# Patient Record
Sex: FEMALE | Race: WHITE | NOT HISPANIC OR LATINO | Employment: UNEMPLOYED | URBAN - METROPOLITAN AREA
[De-identification: names, ages, dates, MRNs, and addresses within clinical notes are randomized per-mention and may not be internally consistent; named-entity substitution may affect disease eponyms.]

---

## 2021-06-15 ENCOUNTER — VBI (OUTPATIENT)
Dept: ADMINISTRATIVE | Facility: OTHER | Age: 41
End: 2021-06-15

## 2021-06-30 ENCOUNTER — ANNUAL EXAM (OUTPATIENT)
Dept: OBGYN CLINIC | Facility: CLINIC | Age: 41
End: 2021-06-30
Payer: COMMERCIAL

## 2021-06-30 VITALS
DIASTOLIC BLOOD PRESSURE: 70 MMHG | SYSTOLIC BLOOD PRESSURE: 110 MMHG | WEIGHT: 147 LBS | BODY MASS INDEX: 25.1 KG/M2 | HEIGHT: 64 IN

## 2021-06-30 DIAGNOSIS — Z01.419 ROUTINE GYNECOLOGICAL EXAMINATION: Primary | ICD-10-CM

## 2021-06-30 DIAGNOSIS — Z12.31 ENCOUNTER FOR SCREENING MAMMOGRAM FOR BREAST CANCER: ICD-10-CM

## 2021-06-30 DIAGNOSIS — Z12.4 SCREENING FOR MALIGNANT NEOPLASM OF THE CERVIX: ICD-10-CM

## 2021-06-30 PROCEDURE — 99395 PREV VISIT EST AGE 18-39: CPT | Performed by: OBSTETRICS & GYNECOLOGY

## 2021-06-30 RX ORDER — METHYLDOPA/HYDROCHLOROTHIAZIDE 250MG-25MG
TABLET ORAL
COMMUNITY

## 2021-06-30 RX ORDER — MULTIVIT WITH MINERALS/LUTEIN
TABLET ORAL
COMMUNITY

## 2021-06-30 RX ORDER — ATORVASTATIN CALCIUM 20 MG/1
1 TABLET, FILM COATED ORAL EVERY 24 HOURS
COMMUNITY
Start: 2021-06-28

## 2021-06-30 RX ORDER — LORATADINE 10 MG/1
TABLET ORAL
COMMUNITY

## 2021-06-30 NOTE — PROGRESS NOTES
[address], [address], [facility], [address] [facility]    ASSESSMENT/PLAN: Isabela Gunn is a 45 y o   who presents for annual gynecologic exam     Encounter for routine gynecologic examination  - Routine well woman exam completed today  - Cervical Cancer Screening: Current ASCCP Guidelines reviewed  Last Pap: 09/15/2017   Next Pap Due: today  - HPV Vaccination status: Not immunized  - Contraceptive counseling discussed  Current contraception: Liletta  - Breast Cancer Screening: Last Mammogram Not on file, ordered  - Colorectal cancer screening was not ordered  - The following were reviewed in today's visit: breast self exam, mammography screening ordered and family planning choices    Additional problems addressed during this visit:  1  Encounter for screening mammogram for breast cancer  -     Mammo screening bilateral w 3d & cad; Future; Expected date: 2021        CC: Annual Gynecologic Examination    HPI: Isabela Gunn is a 45 y o   who presents for annual gynecologic examination  HPI    The following portions of the patient's history were reviewed and updated as appropriate: She  has a past medical history of Abnormal Pap smear of cervix (10/2015), Anemia, and Papanicolaou smear (2017)  She  has a past surgical history that includes Tonsillectomy ()  Her family history includes Breast cancer in her paternal grandmother; Diabetes in her father; Hyperlipidemia in her mother; Hypertension in her father and mother  She  reports that she has quit smoking  She has never used smokeless tobacco  She reports current alcohol use  She reports that she does not use drugs    Current Outpatient Medications   Medication Sig Dispense Refill    atorvastatin (LIPITOR) 20 mg tablet Take 1 tablet by mouth every 24 hours      Echinacea 125 MG CAPS 1 tablet      Levonorgestrel (Liletta, 52 MG,) 19 5 MCG/DAY IUD IUD as directed      loratadine (Allergy) 10 mg tablet 1 tablet      Ascorbic Acid (vitamin C) 1000 MG tablet 1 tablet       No current facility-administered medications for this visit  She is allergic to shellfish-derived products - food allergy       Review of Systems      Objective:  /70   Ht 5' 3 5" (1 613 m)   Wt 66 7 kg (147 lb)   Breastfeeding No   BMI 25 63 kg/m²    Physical Exam  Vitals reviewed  Exam conducted with a chaperone present  Constitutional:       Appearance: Normal appearance  She is normal weight  HENT:      Head: Normocephalic  Eyes:      Conjunctiva/sclera: Conjunctivae normal       Pupils: Pupils are equal, round, and reactive to light  Neck:      Thyroid: No thyroid mass, thyromegaly or thyroid tenderness  Cardiovascular:      Rate and Rhythm: Normal rate  Pulmonary:      Effort: Pulmonary effort is normal    Chest:      Breasts:         Right: Normal  No mass, nipple discharge, skin change or tenderness  Left: Normal  No mass, nipple discharge, skin change or tenderness  Abdominal:      General: Abdomen is flat  Bowel sounds are normal       Palpations: Abdomen is soft  Genitourinary:     General: Normal vulva  Exam position: Lithotomy position  Labia:         Right: No lesion  Left: No lesion  Urethra: No prolapse  Vagina: Normal  No lesions  Cervix: No discharge, lesion or cervical bleeding  Uterus: Normal  Not enlarged and not tender  Adnexa: Right adnexa normal and left adnexa normal         Right: No mass or tenderness  Left: No mass or tenderness  Comments: IUD string in os  Musculoskeletal:      Cervical back: Normal range of motion  Lymphadenopathy:      Cervical: No cervical adenopathy  Upper Body:      Right upper body: No supraclavicular or axillary adenopathy  Left upper body: No supraclavicular or axillary adenopathy  Neurological:      Mental Status: She is alert

## 2021-07-07 LAB
CLINICAL INFO: ABNORMAL
DATE PREVIOUS BX: ABNORMAL
GEN CATEG CVX/VAG CYTO-IMP: ABNORMAL
HPV E6+E7 MRNA CVX QL NAA+PROBE: NOT DETECTED
LMP START DATE: ABNORMAL
SL AMB PREV. PAP:: ABNORMAL
SPECIMEN SOURCE CVX/VAG CYTO: ABNORMAL

## 2021-10-29 ENCOUNTER — OFFICE VISIT (OUTPATIENT)
Dept: OBGYN CLINIC | Facility: CLINIC | Age: 41
End: 2021-10-29
Payer: COMMERCIAL

## 2021-10-29 VITALS
WEIGHT: 147 LBS | BODY MASS INDEX: 25.1 KG/M2 | HEIGHT: 64 IN | SYSTOLIC BLOOD PRESSURE: 112 MMHG | DIASTOLIC BLOOD PRESSURE: 72 MMHG

## 2021-10-29 DIAGNOSIS — B37.3 CANDIDA VAGINITIS: ICD-10-CM

## 2021-10-29 DIAGNOSIS — N90.89 VULVAR EDEMA: ICD-10-CM

## 2021-10-29 DIAGNOSIS — N76.2 ACUTE VULVITIS: Primary | ICD-10-CM

## 2021-10-29 LAB
BV WHIFF TEST VAG QL: ABNORMAL
CLUE CELLS SPEC QL WET PREP: ABNORMAL
PH SMN: 4 [PH]
SL AMB POCT WET MOUNT: ABNORMAL
T VAGINALIS VAG QL WET PREP: ABNORMAL
YEAST VAG QL WET PREP: ABNORMAL

## 2021-10-29 PROCEDURE — 87210 SMEAR WET MOUNT SALINE/INK: CPT | Performed by: OBSTETRICS & GYNECOLOGY

## 2021-10-29 PROCEDURE — 99213 OFFICE O/P EST LOW 20 MIN: CPT | Performed by: OBSTETRICS & GYNECOLOGY

## 2021-10-29 RX ORDER — DIPHENOXYLATE HYDROCHLORIDE AND ATROPINE SULFATE 2.5; .025 MG/1; MG/1
1 TABLET ORAL DAILY
COMMUNITY

## 2021-11-01 ENCOUNTER — TELEPHONE (OUTPATIENT)
Dept: OBGYN CLINIC | Facility: CLINIC | Age: 41
End: 2021-11-01

## 2021-11-03 LAB — QUESTION/PROBLEM: NORMAL

## 2021-12-06 LAB — HSV1 DNA SPEC QL NAA+PROBE: NORMAL

## 2022-05-13 DIAGNOSIS — Z12.31 ENCOUNTER FOR SCREENING MAMMOGRAM FOR BREAST CANCER: ICD-10-CM

## 2022-09-13 ENCOUNTER — ANNUAL EXAM (OUTPATIENT)
Dept: OBGYN CLINIC | Facility: CLINIC | Age: 42
End: 2022-09-13

## 2022-09-13 VITALS
SYSTOLIC BLOOD PRESSURE: 100 MMHG | HEIGHT: 63 IN | WEIGHT: 150.2 LBS | BODY MASS INDEX: 26.61 KG/M2 | DIASTOLIC BLOOD PRESSURE: 60 MMHG

## 2022-09-13 DIAGNOSIS — Z80.3 FAMILY HISTORY OF BREAST CANCER IN FEMALE: ICD-10-CM

## 2022-09-13 DIAGNOSIS — Z12.4 SCREENING FOR MALIGNANT NEOPLASM OF THE CERVIX: ICD-10-CM

## 2022-09-13 DIAGNOSIS — Z12.31 BREAST CANCER SCREENING BY MAMMOGRAM: ICD-10-CM

## 2022-09-13 DIAGNOSIS — Z01.419 ROUTINE GYNECOLOGICAL EXAMINATION: Primary | ICD-10-CM

## 2022-09-13 NOTE — PROGRESS NOTES
[address] [place] [address], [address], [address] [place] [address]    ASSESSMENT/PLAN: Ashley Graham is a 37 y o   who presents for annual gynecologic exam     Encounter for routine gynecologic examination  - Routine well woman exam completed today  - Cervical Cancer Screening: Current ASCCP Guidelines reviewed  Last Pap: 2021   Next Pap Due:   - HPV Vaccination status: Not immunized  - Contraceptive counseling discussed  Current contraception: Daleen Jovany IUD since 2019  - Breast Cancer Screening: Last Mammogram 2022,   - Colorectal cancer screening was not ordered  - The following were reviewed in today's visit: breast self exam, mammography screening ordered and family planning choices    Additional problems addressed during this visit:  1  Routine gynecological examination    2  Family history of breast cancer in female  -     Mammo screening bilateral w 3d & cad; Future; Expected date: 2023    3  Breast cancer screening by mammogram  -     Mammo screening bilateral w 3d & cad; Future; Expected date: 2023    4  Screening for malignant neoplasm of the cervix  -     Thinprep Pap and HPV mRNA E6/E7 Reflex HPV 16,18/45        CC:  Annual Gynecologic Examination    HPI: Ashley Graham is a 37 y o   who presents for annual gynecologic examination  HPI    The following portions of the patient's history were reviewed and updated as appropriate: She  has a past medical history of Abnormal Pap smear of cervix (10/2015), Anemia, and Papanicolaou smear (2017)  She  has a past surgical history that includes Tonsillectomy ()  Her family history includes Breast cancer in her paternal grandmother; Diabetes in her father; Hyperlipidemia in her mother; Hypertension in her father and mother  She  reports that she has quit smoking  She has never used smokeless tobacco  She reports current alcohol use  She reports that she does not use drugs    Current Outpatient Medications   Medication Sig Dispense Refill    Ascorbic Acid (vitamin C) 1000 MG tablet 1 tablet      atorvastatin (LIPITOR) 20 mg tablet Take 1 tablet by mouth every 24 hours      Echinacea 125 MG CAPS 1 tablet      Levonorgestrel (Liletta, 52 MG,) 19 5 MCG/DAY IUD IUD as directed      loratadine (CLARITIN) 10 mg tablet 1 tablet      multivitamin (THERAGRAN) TABS Take 1 tablet by mouth daily       No current facility-administered medications for this visit  She is allergic to other and shellfish-derived products - food allergy       Review of Systems      Objective:  /60 (BP Location: Left arm, Patient Position: Sitting, Cuff Size: Standard)   Ht 5' 3 25" (1 607 m)   Wt 68 1 kg (150 lb 3 2 oz)   LMP 08/30/2022 (Approximate) Comment: has Cindra Pardon IUD  Breastfeeding No   BMI 26 40 kg/m²    Physical Exam      PE:  General Appearance: alert and oriented, in no acute distress  HEENT: PERRL, thyroid without masses or tenderness  Breast: No masses, tenderness, skin changes, nipple D/C or axillary or supraclavicular adenopathy  Abdomen: Soft, non-tender, non-distended, no masses, no rebound or guarding  Pelvic:       External genitalia: Normal appearance, no abnormal pigmentation, no lesions or masses  Normal Bartholin's and [place]'s  Urinary system: Urethral meatus normal, bladder non-tender  Vaginal: normal mucosa without prolapse or lesions  Normal-appearing physiologic discharge      Cervix: Normal-appearing, well-epithelialized, no gross lesions or masses No cervical motion tenderness  Adnexa: No adnexal masses or tenderness noted  Uterus: Normal-sized, regular contour, midline, mobile, no uterine tenderness  Extremities: Normal range of motion     Skin: normal, no rash or abnormalities  Neurologic: alert, oriented x3  Psychiatric: Appropriate affect, mood stable, cooperative with exam

## 2022-09-15 LAB
CLINICAL INFO: ABNORMAL
CYTO CVX: ABNORMAL
DATE PREVIOUS BX: ABNORMAL
GEN CATEG CVX/VAG CYTO-IMP: ABNORMAL
HPV E6+E7 MRNA CVX QL NAA+PROBE: DETECTED
LMP START DATE: ABNORMAL
SL AMB PREV. PAP:: ABNORMAL
SPECIMEN SOURCE CVX/VAG CYTO: ABNORMAL

## 2022-09-19 ENCOUNTER — TELEPHONE (OUTPATIENT)
Dept: OBGYN CLINIC | Facility: CLINIC | Age: 42
End: 2022-09-19

## 2022-09-19 NOTE — TELEPHONE ENCOUNTER
Left message on pt's v/m that okay to continue with scheduled appt on 10/1p with  [facility]  No need to change to sooner date  Encouraged pt to call back with any questions

## 2022-09-19 NOTE — TELEPHONE ENCOUNTER
Requesting to have Colposcopy with Dr Gretchen Ambrosio only, first available is 10/19, Stalin Chi is ok waiting if he states it is ok   Please advise

## 2022-10-19 ENCOUNTER — PROCEDURE VISIT (OUTPATIENT)
Dept: OBGYN CLINIC | Facility: CLINIC | Age: 42
End: 2022-10-19

## 2022-10-19 VITALS
WEIGHT: 149 LBS | BODY MASS INDEX: 26.4 KG/M2 | SYSTOLIC BLOOD PRESSURE: 100 MMHG | DIASTOLIC BLOOD PRESSURE: 60 MMHG | HEIGHT: 63 IN

## 2022-10-19 DIAGNOSIS — R87.613 HGSIL (HIGH GRADE SQUAMOUS INTRAEPITHELIAL LESION) ON PAP SMEAR OF CERVIX: Primary | ICD-10-CM

## 2022-10-19 DIAGNOSIS — R87.810 CERVICAL HIGH RISK HUMAN PAPILLOMAVIRUS (HPV) DNA TEST POSITIVE: ICD-10-CM

## 2022-10-19 PROBLEM — R87.610 ATYPICAL SQUAMOUS CELL CHANGES OF UNDETERMINED SIGNIFICANCE (ASCUS) ON CERVICAL CYTOLOGY WITH POSITIVE HIGH RISK HUMAN PAPILLOMA VIRUS (HPV): Status: RESOLVED | Noted: 2022-10-19 | Resolved: 2022-10-19

## 2022-10-19 PROBLEM — R87.610 ATYPICAL SQUAMOUS CELL CHANGES OF UNDETERMINED SIGNIFICANCE (ASCUS) ON CERVICAL CYTOLOGY WITH POSITIVE HIGH RISK HUMAN PAPILLOMA VIRUS (HPV): Status: ACTIVE | Noted: 2022-10-19

## 2022-10-21 LAB
CLINICAL INFO: NORMAL
PATH REPORT.FINAL DX SPEC: NORMAL
PATHOLOGIST NAME: NORMAL
SPECIMEN SOURCE: NORMAL

## 2022-11-02 ENCOUNTER — OFFICE VISIT (OUTPATIENT)
Dept: OBGYN CLINIC | Facility: CLINIC | Age: 42
End: 2022-11-02

## 2022-11-02 VITALS
WEIGHT: 149.4 LBS | BODY MASS INDEX: 26.47 KG/M2 | DIASTOLIC BLOOD PRESSURE: 74 MMHG | HEIGHT: 63 IN | SYSTOLIC BLOOD PRESSURE: 100 MMHG

## 2022-11-02 DIAGNOSIS — D06.9 HIGH GRADE SQUAMOUS INTRAEPITHELIAL LESION (HGSIL), GRADE 3 CIN, ON BIOPSY OF CERVIX: Primary | ICD-10-CM

## 2022-11-02 DIAGNOSIS — R87.613 HGSIL (HIGH GRADE SQUAMOUS INTRAEPITHELIAL LESION) ON PAP SMEAR OF CERVIX: ICD-10-CM

## 2022-11-16 ENCOUNTER — ANESTHESIA EVENT (OUTPATIENT)
Dept: PERIOP | Facility: HOSPITAL | Age: 42
End: 2022-11-16

## 2022-11-18 RX ORDER — IBUPROFEN 200 MG
TABLET ORAL EVERY 6 HOURS PRN
COMMUNITY

## 2022-11-18 RX ORDER — ACETAMINOPHEN 500 MG
500 TABLET ORAL EVERY 6 HOURS PRN
COMMUNITY

## 2022-11-18 NOTE — PRE-PROCEDURE INSTRUCTIONS
Pre-Surgery Instructions:   Medication Instructions   • acetaminophen (TYLENOL) 500 mg tablet Uses PRN- OK to take day of surgery   • Ascorbic Acid (vitamin C) 1000 MG tablet Stop 11/18  Do not take morning of surgery   • atorvastatin (LIPITOR) 20 mg tablet Take night before surgery   • Echinacea 125 MG CAPS Stop 11/18  Do not take morning of surgery   • ibuprofen (MOTRIN) 200 mg tablet Stop 11/18  Do not take morning of surgery   • Levonorgestrel (Liletta, 52 MG,) 19 5 MCG/DAY IUD IUD Implant   • loratadine (CLARITIN) 10 mg tablet Uses PRN- OK to take day of surgery   • multivitamin (THERAGRAN) TABS Stop 11/18  Do not take morning of surgery   Covid screening negative as per patient  Fully vaccinated  Reviewed showering and medication instructions  Take medications morning of surgery with a small sip of water  Patient verbalized understanding  Advised NPO after MN and ASC will call with scheduled surgical time  Instructed to stop NSAIDS and non prescribed vitamins  Tylenol is OK to take

## 2022-11-21 ENCOUNTER — HOSPITAL ENCOUNTER (OUTPATIENT)
Facility: HOSPITAL | Age: 42
Setting detail: OUTPATIENT SURGERY
Discharge: HOME | End: 2022-11-21
Attending: OBSTETRICS & GYNECOLOGY | Admitting: OBSTETRICS & GYNECOLOGY

## 2022-11-21 ENCOUNTER — ANESTHESIA (OUTPATIENT)
Dept: PERIOP | Facility: HOSPITAL | Age: 42
End: 2022-11-21

## 2022-11-21 VITALS
HEART RATE: 58 BPM | OXYGEN SATURATION: 98 % | TEMPERATURE: 98 F | SYSTOLIC BLOOD PRESSURE: 124 MMHG | DIASTOLIC BLOOD PRESSURE: 73 MMHG | RESPIRATION RATE: 12 BRPM

## 2022-11-21 DIAGNOSIS — D06.9 CIN III WITH SEVERE DYSPLASIA: ICD-10-CM

## 2022-11-21 DIAGNOSIS — N87.1 DYSPLASIA OF CERVIX, HIGH GRADE CIN 2: ICD-10-CM

## 2022-11-21 LAB
EXT PREGNANCY TEST URINE: NEGATIVE
EXT. CONTROL: NORMAL

## 2022-11-21 RX ORDER — KETOROLAC TROMETHAMINE 30 MG/ML
INJECTION, SOLUTION INTRAMUSCULAR; INTRAVENOUS AS NEEDED
Status: DISCONTINUED | OUTPATIENT
Start: 2022-11-21 | End: 2022-11-21

## 2022-11-21 RX ORDER — SODIUM CHLORIDE, SODIUM LACTATE, POTASSIUM CHLORIDE, CALCIUM CHLORIDE 600; 310; 30; 20 MG/100ML; MG/100ML; MG/100ML; MG/100ML
125 INJECTION, SOLUTION INTRAVENOUS CONTINUOUS
Status: DISCONTINUED | OUTPATIENT
Start: 2022-11-21 | End: 2022-11-21 | Stop reason: HOSPADM

## 2022-11-21 RX ORDER — LIDOCAINE HYDROCHLORIDE 10 MG/ML
INJECTION, SOLUTION EPIDURAL; INFILTRATION; INTRACAUDAL; PERINEURAL AS NEEDED
Status: DISCONTINUED | OUTPATIENT
Start: 2022-11-21 | End: 2022-11-21 | Stop reason: HOSPADM

## 2022-11-21 RX ORDER — ONDANSETRON 2 MG/ML
INJECTION INTRAMUSCULAR; INTRAVENOUS AS NEEDED
Status: DISCONTINUED | OUTPATIENT
Start: 2022-11-21 | End: 2022-11-21

## 2022-11-21 RX ORDER — LIDOCAINE HYDROCHLORIDE 10 MG/ML
INJECTION, SOLUTION EPIDURAL; INFILTRATION; INTRACAUDAL; PERINEURAL AS NEEDED
Status: DISCONTINUED | OUTPATIENT
Start: 2022-11-21 | End: 2022-11-21

## 2022-11-21 RX ORDER — MIDAZOLAM HYDROCHLORIDE 2 MG/2ML
INJECTION, SOLUTION INTRAMUSCULAR; INTRAVENOUS AS NEEDED
Status: DISCONTINUED | OUTPATIENT
Start: 2022-11-21 | End: 2022-11-21

## 2022-11-21 RX ORDER — FENTANYL CITRATE 50 UG/ML
INJECTION, SOLUTION INTRAMUSCULAR; INTRAVENOUS AS NEEDED
Status: DISCONTINUED | OUTPATIENT
Start: 2022-11-21 | End: 2022-11-21

## 2022-11-21 RX ORDER — PROPOFOL 10 MG/ML
INJECTION, EMULSION INTRAVENOUS CONTINUOUS PRN
Status: DISCONTINUED | OUTPATIENT
Start: 2022-11-21 | End: 2022-11-21

## 2022-11-21 RX ORDER — ACETIC ACID 5 %
LIQUID (ML) MISCELLANEOUS AS NEEDED
Status: DISCONTINUED | OUTPATIENT
Start: 2022-11-21 | End: 2022-11-21 | Stop reason: HOSPADM

## 2022-11-21 RX ORDER — PROPOFOL 10 MG/ML
INJECTION, EMULSION INTRAVENOUS AS NEEDED
Status: DISCONTINUED | OUTPATIENT
Start: 2022-11-21 | End: 2022-11-21

## 2022-11-21 RX ADMIN — ONDANSETRON 4 MG: 2 INJECTION INTRAMUSCULAR; INTRAVENOUS at 12:47

## 2022-11-21 RX ADMIN — LIDOCAINE HYDROCHLORIDE 50 MG: 10 INJECTION, SOLUTION EPIDURAL; INFILTRATION; INTRACAUDAL; PERINEURAL at 12:47

## 2022-11-21 RX ADMIN — SODIUM CHLORIDE, SODIUM LACTATE, POTASSIUM CHLORIDE, AND CALCIUM CHLORIDE: .6; .31; .03; .02 INJECTION, SOLUTION INTRAVENOUS at 12:40

## 2022-11-21 RX ADMIN — MIDAZOLAM 2 MG: 1 INJECTION INTRAMUSCULAR; INTRAVENOUS at 12:40

## 2022-11-21 RX ADMIN — KETOROLAC TROMETHAMINE 30 MG: 30 INJECTION, SOLUTION INTRAMUSCULAR; INTRAVENOUS at 13:02

## 2022-11-21 RX ADMIN — PROPOFOL 120 MCG/KG/MIN: 10 INJECTION, EMULSION INTRAVENOUS at 12:48

## 2022-11-21 RX ADMIN — FENTANYL CITRATE 25 MCG: 50 INJECTION INTRAMUSCULAR; INTRAVENOUS at 12:58

## 2022-11-21 RX ADMIN — FENTANYL CITRATE 25 MCG: 50 INJECTION INTRAMUSCULAR; INTRAVENOUS at 12:53

## 2022-11-21 RX ADMIN — FENTANYL CITRATE 50 MCG: 50 INJECTION INTRAMUSCULAR; INTRAVENOUS at 12:47

## 2022-11-21 RX ADMIN — PROPOFOL 60 MG: 10 INJECTION, EMULSION INTRAVENOUS at 12:47

## 2022-11-21 NOTE — ANESTHESIA PREPROCEDURE EVALUATION
Procedure:  BIOPSY LEEP CERVIX (Cervix)    Relevant Problems   ANESTHESIA (within normal limits)      CARDIO   (+) Hyperlipidemia      PULMONARY (within normal limits)   (-) Sleep apnea   (-) Smoking   (-) URI (upper respiratory infection)      Other   (+) HGSIL (high grade squamous intraepithelial lesion) on Pap smear of cervix   (+) Seasonal allergies     Physical Exam    Airway    Mallampati score: I  TM Distance: >3 FB  Neck ROM: full     Dental   No notable dental hx     Cardiovascular      Pulmonary      Other Findings         Anesthesia Plan  ASA Score- 2     Anesthesia Type- IV sedation with anesthesia with ASA Monitors  Additional Monitors:   Airway Plan:           Plan Factors-Exercise tolerance (METS): >4 METS  Chart reviewed  Existing labs reviewed  Patient summary reviewed  Patient is not a current smoker  Patient did not smoke on day of surgery  Induction- intravenous  Postoperative Plan-     Informed Consent- Anesthetic plan and risks discussed with patient and spouse  I personally reviewed this patient with the CRNA  Discussed and agreed on the Anesthesia Plan with the CRNA  Feliciano Zelaya

## 2022-11-21 NOTE — ASSESSMENT & PLAN NOTE
Colposcopic biopsy shows ISA 2-3, consistent with pap smear results  Recommend LEEP procedure for further diagnosis and treatment  Explained procedure to patient including risk and benefits, as well as alternatives  Reviewed surgical and recovery expectations  Questions answered and pt desires to proceed with surgery  Consent obtained

## 2022-11-21 NOTE — OP NOTE
OPERATIVE REPORT  PATIENT NAME: Carolina Briceno    :  1980  MRN: [id number]  Pt Location:  OR ROOM 04    SURGERY DATE: 2022    Surgeon(s) and Role:     Corina Auguste MD - Primary    Preop Diagnosis:  Dysplasia of cervix, high grade ISA 2 [N87 1]  ISA III with severe dysplasia [D06 9]    Post-Op Diagnosis Codes: * Dysplasia of cervix, high grade ISA 2 [N87 1]     * ISA III with severe dysplasia [D06 9]    Procedure(s) (LRB):  BIOPSY LEEP CERVIX (N/A)    Specimen(s):  ID Type Source Tests Collected by Time Destination   1 : Anterior cervical cone biopsy Tissue Cervix TISSUE EXAM Indra Harris MD 2022 1300    2 : Posterior cervical cone biopsy Tissue Cervix TISSUE EXAM Indra Harris MD 2022 1300        Estimated Blood Loss:   Minimal    Drains:  * No LDAs found *    Anesthesia Type:   IV Sedation with Anesthesia    Operative Indications:  Dysplasia of cervix, high grade ISA 2 [N87 1]  ISA III with severe dysplasia [D06 9]      Operative Findings:  Acetowhite noted @ 12 oclock    Complications:   None    Procedure and Technique:  The patient was identified by the surgeon and the OR team, per procedure  She was taken to the OR, where she was given TIVA anesthesia  She was placed in the dorsal lithotomy position in [address] [place] [address] and prepped and draped in a steril fashion  Operative time out was performed per protocol  Electrically shielded speculum and sidewall retractors were placed  3% Acetic acid was applied to the cervix and an acetowhite area was noted at 12 o'clock  10 ml of 1% lidocaine was injected for a paracervical block  The cervix was large and a 20 x 15 mm loop was selected  2 passes of the loopwere performed, first removing the anterior lip and then the posterior lip  The settings were 70 cut/30 coagulation blend  There was minimal bleeding noted  The bed was cauterized with a ball cautery and then Monsel's solution was placed to ensure hemostasis   All instruments were removed from the vagina and the patient was allowed to awaken from anesthesia  All surgical counts were correct  She was taken awake and alert to the PACU and she tolerated the procedure well     I was present for the entire procedure    Patient Disposition:  PACU         SIGNATURE: Indra Harris MD  DATE: November 21, 2022  TIME: 1:22 PM

## 2022-11-21 NOTE — ANESTHESIA POSTPROCEDURE EVALUATION
Post-Op Assessment Note    CV Status:  Stable  Pain Score: 0    Pain management: adequate     Mental Status:  Awake and somnolent   Hydration Status:  Stable   PONV Controlled:  None   Airway Patency:  Patent      Post Op Vitals Reviewed: Yes      Staff: CRNA, Anesthesiologist   Comments: Pt in PACU, airway patent, VSS  No c/o pain or nausea  No notable events documented      BP   106/59 (77)   Temp      Pulse   61   Resp   14   SpO2   96% on RA

## 2022-11-21 NOTE — PROGRESS NOTES
[address] [facility], [address], [address] [facility]    Assessment/Plan:  1  High grade squamous intraepithelial lesion (HGSIL), grade 3 ISA, on biopsy of cervix  Assessment & Plan:  Colposcopic biopsy shows ISA 2-3, consistent with pap smear results  Recommend LEEP procedure for further diagnosis and treatment  Explained procedure to patient including risk and benefits, as well as alternatives  Reviewed surgical and recovery expectations  Questions answered and pt desires to proceed with surgery  Consent obtained  2  HGSIL (high grade squamous intraepithelial lesion) on Pap smear of cervix        Subjective:   Ninoska Regalado is a 40 y o     CC:   Chief Complaint   Patient presents with   • Consult     Pt states no concerns, here for surgical consult       HPI: see above    ROS: Negative except as noted in HPI    Patient's last menstrual period was 10/12/2022 (exact date)  She  reports being sexually active and has had partner(s) who are male  She reports using the following method of birth control/protection: I U D          The following portions of the patient's history were reviewed and updated as appropriate:   Past Medical History:   Diagnosis Date   • Abnormal Pap smear of cervix 10/2015    ASCUS  HPV(+)   • Anemia    • Frequent headaches    • Hyperlipidemia    • Papanicolaou smear 2017   • Seasonal allergies      Past Surgical History:   Procedure Laterality Date   • TONSILLECTOMY     • WISDOM TOOTH EXTRACTION       Family History   Problem Relation Age of Onset   • Hypertension Mother    • Hyperlipidemia Mother    • Diabetes Father    • Hypertension Father    • Breast cancer Paternal Grandmother      Social History     Socioeconomic History   • Marital status: /Civil Union     Spouse name: Not on file   • Number of children: Not on file   • Years of education: Not on file   • Highest education level: Not on file   Occupational History   • Occupation:     Tobacco Use   • Smoking status: Former   • Smokeless tobacco: Never   Vaping Use   • Vaping Use: Never used   Substance and Sexual Activity   • Alcohol use: Yes     Alcohol/week: 2 0 standard drinks     Types: 2 Cans of beer per week     Comment: Socially on weekends (1-2 beers per week)   • Drug use: Not Currently     Types: Marijuana     Comment: Late teens   • Sexual activity: Yes     Partners: Male     Birth control/protection: I U D  Comment: Clayton De Luna 6/11/2019    Other Topics Concern   • Not on file   Social History Narrative    Exercise: 2-3 times a week    Domestic violence: No    Marital status: Dating    - As per [employer]      Social Determinants of Health     Financial Resource Strain: Not on file   Food Insecurity: Not on file   Transportation Needs: Not on file   Physical Activity: Not on file   Stress: Not on file   Social Connections: Not on file   Intimate Partner Violence: Not on file   Housing Stability: Not on file     Outpatient Medications Marked as Taking for the 11/2/22 encounter (Office Visit) with Alda Hand MD   Medication   • Ascorbic Acid (vitamin C) 1000 MG tablet   • atorvastatin (LIPITOR) 20 mg tablet   • Echinacea 125 MG CAPS   • Levonorgestrel (Liletta, 52 MG,) 19 5 MCG/DAY IUD IUD   • loratadine (CLARITIN) 10 mg tablet   • multivitamin (THERAGRAN) TABS     Allergies   Allergen Reactions   • Other Vomiting     Mushrooms   • Shellfish-Derived Products - Food Allergy Hives     Family history with Father anaphylaxis           Objective:  /74 (BP Location: Left arm, Patient Position: Sitting, Cuff Size: Standard)   Ht 5' 3 25" (1 607 m)   Wt 67 8 kg (149 lb 6 4 oz)   LMP 10/12/2022 (Exact Date)   BMI 26 26 kg/m²        Chaperone present? Yes:      General Appearance: alert and oriented, in no acute distress  Heart - RRR  Lungs - CTA  Abdomen: Soft, non-tender, non-distended, no masses, no rebound or guarding    Pelvic:       External genitalia: Normal appearance, no abnormal pigmentation, no lesions or masses  Normal Bartholin's and [place]'s  Urinary system: Urethral meatus normal, bladder non-tender  Vaginal: normal mucosa without prolapse or lesions  Normal-appearing physiologic discharge  Cervix: Normal-appearing, well-epithelialized, no gross lesions or masses  No cervical motion tenderness  Adnexa: No adnexal masses or tenderness noted  Uterus: Normal-sized, regular contour, midline, mobile, no uterine tenderness  Extremities: Normal range of motion     Skin: normal, no rash or abnormalities  Neurologic: alert, oriented x3  Psychiatric: Appropriate affect, mood stable, cooperative with exam         Tari Arce MD

## 2022-11-21 NOTE — H&P
[address] [facility], [address], [address] [facility]    Assessment/Plan:  1  High grade squamous intraepithelial lesion (HGSIL), grade 3 ISA, on biopsy of cervix  Assessment & Plan:  Colposcopic biopsy shows ISA 2-3, consistent with pap smear results  Recommend LEEP procedure for further diagnosis and treatment  Explained procedure to patient including risk and benefits, as well as alternatives  Reviewed surgical and recovery expectations  Questions answered and pt desires to proceed with surgery  Consent obtained  2  HGSIL (high grade squamous intraepithelial lesion) on Pap smear of cervix      Subjective:   Kim Rodriguez is a 40 y o     CC:   Chief Complaint   Patient presents with   • Consult     Pt states no concerns, here for surgical consult       HPI: see above    ROS: Negative except as noted in HPI    Patient's last menstrual period was 10/12/2022 (exact date)  She  reports being sexually active and has had partner(s) who are male  She reports using the following method of birth control/protection: I U D          The following portions of the patient's history were reviewed and updated as appropriate:   Past Medical History:   Diagnosis Date   • Abnormal Pap smear of cervix 10/2015    ASCUS  HPV(+)   • Anemia    • Frequent headaches    • Hyperlipidemia    • Papanicolaou smear 2017   • Seasonal allergies      Past Surgical History:   Procedure Laterality Date   • TONSILLECTOMY     • WISDOM TOOTH EXTRACTION       Family History   Problem Relation Age of Onset   • Hypertension Mother    • Hyperlipidemia Mother    • Diabetes Father    • Hypertension Father    • Breast cancer Paternal Grandmother      Social History     Socioeconomic History   • Marital status: /Civil Union     Spouse name: Not on file   • Number of children: Not on file   • Years of education: Not on file   • Highest education level: Not on file   Occupational History   • Occupation:     Tobacco Use   • Smoking status: Former   • Smokeless tobacco: Never   Vaping Use   • Vaping Use: Never used   Substance and Sexual Activity   • Alcohol use: Yes     Alcohol/week: 2 0 standard drinks     Types: 2 Cans of beer per week     Comment: Socially on weekends (1-2 beers per week)   • Drug use: Not Currently     Types: Marijuana     Comment: Late teens   • Sexual activity: Yes     Partners: Male     Birth control/protection: I U D  Comment: Raul Stein 6/11/2019    Other Topics Concern   • Not on file   Social History Narrative    Exercise: 2-3 times a week    Domestic violence: No    Marital status: Dating    - As per [employer]      Social Determinants of Health     Financial Resource Strain: Not on file   Food Insecurity: Not on file   Transportation Needs: Not on file   Physical Activity: Not on file   Stress: Not on file   Social Connections: Not on file   Intimate Partner Violence: Not on file   Housing Stability: Not on file     Outpatient Medications Marked as Taking for the 11/2/22 encounter (Office Visit) with Amber Castelan MD   Medication   • Ascorbic Acid (vitamin C) 1000 MG tablet   • atorvastatin (LIPITOR) 20 mg tablet   • Echinacea 125 MG CAPS   • Levonorgestrel (Liletta, 52 MG,) 19 5 MCG/DAY IUD IUD   • loratadine (CLARITIN) 10 mg tablet   • multivitamin (THERAGRAN) TABS     Allergies   Allergen Reactions   • Other Vomiting     Mushrooms   • Shellfish-Derived Products - Food Allergy Hives     Family history with Father anaphylaxis           Objective:  /74 (BP Location: Left arm, Patient Position: Sitting, Cuff Size: Standard)   Ht 5' 3 25" (1 607 m)   Wt 67 8 kg (149 lb 6 4 oz)   LMP 10/12/2022 (Exact Date)   BMI 26 26 kg/m²        Chaperone present? Yes:      General Appearance: alert and oriented, in no acute distress  Heart - RRR  Lungs - CTA  Abdomen: Soft, non-tender, non-distended, no masses, no rebound or guarding    Pelvic:       External genitalia: Normal appearance, no abnormal pigmentation, no lesions or masses  Normal Bartholin's and [place]'s  Urinary system: Urethral meatus normal, bladder non-tender  Vaginal: normal mucosa without prolapse or lesions  Normal-appearing physiologic discharge  Cervix: Normal-appearing, well-epithelialized, no gross lesions or masses  No cervical motion tenderness  Adnexa: No adnexal masses or tenderness noted  Uterus: Normal-sized, regular contour, midline, mobile, no uterine tenderness  Extremities: Normal range of motion     Skin: normal, no rash or abnormalities  Neurologic: alert, oriented x3  Psychiatric: Appropriate affect, mood stable, cooperative with exam         Letty Parish MD

## 2022-11-29 NOTE — PROGRESS NOTES
Colposcopy     Date/Time 10/19/2022 11:17 AM     Universal Protocol   Risks and benefits: risks, benefits and alternatives were discussed  Consent given by: patient  Time out: Immediately prior to procedure a "time out" was called to verify the correct patient, procedure, equipment, support staff and site/side marked as required  Patient understanding: patient states understanding of the procedure being performed  Patient identity confirmed: verbally with patient       Performed by  Marleen Felty, MD     Authorized by Marleen Felty, MD        Pre-procedure details     Pre-procedure timeout performed: yes      Prepped with: acetic acid       Indication    HSIL     Procedure Details   Procedure: Colposcopy w/ cervical biopsy and ECC      Under satisfactory analgesia the patient was prepped and draped in the dorsal lithotomy position: yes      Hinckley speculum was placed in the vagina: yes      Under colposcopic examination the transition zone was seen in entirety: yes      Endocervix was curetted using a Kevorkian curette: yes      Cervical biopsy performed with a cervical biopsy punch: yes      Monsel's solution was applied: yes      Biopsy(s): yes      Location:  4 and 12     Post-procedure      Findings: Mosaicism and White epithelium      Impression: High grade cervical dysplasia      Patient tolerance of procedure: Tolerated well, no immediate complications     Comments       Explained that if high grade dysplasia is confirmed then LEEP will be recommended

## (undated) DEVICE — STRL ALLENTOWN HYSTEROSCOPY PK: Brand: CARDINAL HEALTH

## (undated) DEVICE — PENCIL ELECTROSURG E-Z CLEAN -0035H

## (undated) DEVICE — LLETZ LOOP 20 X 15MM MEGADYNE

## (undated) DEVICE — ARTHROSCOPY FLOOR MAT

## (undated) DEVICE — NEEDLE SPINAL 22G X 3.5IN  QUINCKE

## (undated) DEVICE — SPONGE STICK WITH PVP-I: Brand: KENDALL

## (undated) DEVICE — MEDI-VAC YANKAUER SUCTION HANDLE W/STRAIGHT TIP & CONTROL VENT: Brand: CARDINAL HEALTH

## (undated) DEVICE — SYRINGE 10ML LL CONTROL TOP

## (undated) DEVICE — PAD GROUNDING ADULT

## (undated) DEVICE — GLOVE SRG LF STRL BGL SKNSNS 8 PF

## (undated) DEVICE — MAXI PAD5.51 X 13.78 IN. (14.0 X 35.0 CM)HEAVYCONTOUREDUNSCENTED: Brand: CURITY

## (undated) DEVICE — SPECIMEN CONTAINER STERILE PEEL PACK

## (undated) DEVICE — STERILE 8 INCH PROCTO SWAB: Brand: CARDINAL HEALTH

## (undated) DEVICE — ELECTRODE BALL E-Z CLEAN 5 IN -0009

## (undated) DEVICE — TUBING SUCTION 5MM X 12 FT